# Patient Record
Sex: FEMALE | Race: WHITE | URBAN - METROPOLITAN AREA
[De-identification: names, ages, dates, MRNs, and addresses within clinical notes are randomized per-mention and may not be internally consistent; named-entity substitution may affect disease eponyms.]

---

## 2021-09-18 ENCOUNTER — E-VISIT (OUTPATIENT)
Dept: PRIMARY CARE CLINIC | Age: 21
End: 2021-09-18

## 2021-09-18 DIAGNOSIS — L70.9 ACNE, UNSPECIFIED ACNE TYPE: Primary | ICD-10-CM

## 2021-09-18 PROCEDURE — 99423 OL DIG E/M SVC 21+ MIN: CPT | Performed by: INTERNAL MEDICINE

## 2021-09-18 RX ORDER — DOXYCYCLINE HYCLATE 100 MG
100 TABLET ORAL 2 TIMES DAILY
Qty: 60 TABLET | Refills: 0 | Status: SHIPPED | OUTPATIENT
Start: 2021-09-18 | End: 2021-09-18 | Stop reason: SDUPTHER

## 2021-09-18 NOTE — PROGRESS NOTES
I have started you on the antibiotic doxycycline 100 mg twice a day for a month to treat your acne. Please do not take this medication if you are pregnant or trying to become pregnant. See your PCP for follow up soon to monitor progress and other suggestions. 21 +  Minutes were spent on this E visit.

## 2021-12-30 ENCOUNTER — E-VISIT (OUTPATIENT)
Dept: FAMILY MEDICINE CLINIC | Age: 21
End: 2021-12-30

## 2021-12-30 DIAGNOSIS — L70.0 ACNE VULGARIS: Primary | ICD-10-CM

## 2021-12-30 PROCEDURE — 99423 OL DIG E/M SVC 21+ MIN: CPT | Performed by: FAMILY MEDICINE

## 2021-12-30 RX ORDER — ADAPALENE AND BENZOYL PEROXIDE .1; 2.5 G/100G; G/100G
1 GEL TOPICAL NIGHTLY
Qty: 45 G | Refills: 0 | Status: SHIPPED | OUTPATIENT
Start: 2021-12-30 | End: 2022-06-07 | Stop reason: ALTCHOICE

## 2021-12-31 NOTE — PROGRESS NOTES
HPI: as per patient provided history  Exam: N/A (electronic visit)  ASSESSMENT/PLAN:  1. Acne vulgaris    - Adapalene-Benzoyl Peroxide 0.1-2.5 % GEL; Apply 1 g topically nightly  Dispense: 45 g; Refill: 0      Patient instructed to call the office if worsens, or fails to improve as anticipated. > 20 minutes were spent on the digital evaluation and management of this patient.

## 2022-01-06 ENCOUNTER — TELEPHONE (OUTPATIENT)
Dept: RHEUMATOLOGY | Age: 22
End: 2022-01-06

## 2022-01-06 NOTE — TELEPHONE ENCOUNTER
PA COVER  MY MED[facility]  Medication:Adapalene-Benzoyl Peroxide 0.1-2.5% gel  Key: [id number] - PA Case ID: [id number]  Status:PENDING

## 2022-01-07 NOTE — TELEPHONE ENCOUNTER
Received DENIAL for Adapalene-Benzoyl Peroxide 0.1-2.5% gel. Denial letter attached. Please advise patient. Thank you.